# Patient Record
Sex: MALE | Race: WHITE
[De-identification: names, ages, dates, MRNs, and addresses within clinical notes are randomized per-mention and may not be internally consistent; named-entity substitution may affect disease eponyms.]

---

## 2024-04-28 ENCOUNTER — APPOINTMENT (OUTPATIENT)
Facility: HOSPITAL | Age: 44
End: 2024-04-28

## 2024-04-28 ENCOUNTER — HOSPITAL ENCOUNTER (EMERGENCY)
Facility: HOSPITAL | Age: 44
Discharge: ANOTHER HOSPITAL | End: 2024-04-28
Attending: STUDENT IN AN ORGANIZED HEALTH CARE EDUCATION/TRAINING PROGRAM

## 2024-04-28 VITALS
TEMPERATURE: 98 F | SYSTOLIC BLOOD PRESSURE: 117 MMHG | HEIGHT: 69 IN | OXYGEN SATURATION: 93 % | BODY MASS INDEX: 22.86 KG/M2 | HEART RATE: 62 BPM | DIASTOLIC BLOOD PRESSURE: 83 MMHG | RESPIRATION RATE: 22 BRPM | WEIGHT: 154.32 LBS

## 2024-04-28 DIAGNOSIS — I60.9 SAH (SUBARACHNOID HEMORRHAGE) (HCC): Primary | ICD-10-CM

## 2024-04-28 PROBLEM — R93.0 ABNORMAL CT OF THE HEAD: Status: ACTIVE | Noted: 2024-04-28

## 2024-04-28 LAB
ALBUMIN SERPL-MCNC: 4 G/DL (ref 3.5–5)
ALBUMIN/GLOB SERPL: 1.2 (ref 1.1–2.2)
ALP SERPL-CCNC: 91 U/L (ref 45–117)
ALT SERPL-CCNC: 25 U/L (ref 12–78)
ANION GAP SERPL CALC-SCNC: 6 MMOL/L (ref 5–15)
APPEARANCE UR: CLEAR
AST SERPL-CCNC: 37 U/L (ref 15–37)
BACTERIA URNS QL MICRO: NEGATIVE /HPF
BASOPHILS # BLD: 0.1 K/UL (ref 0–0.1)
BASOPHILS NFR BLD: 1 % (ref 0–1)
BILIRUB SERPL-MCNC: 0.9 MG/DL (ref 0.2–1)
BILIRUB UR QL: NEGATIVE
BUN SERPL-MCNC: 22 MG/DL (ref 6–20)
BUN/CREAT SERPL: 25 (ref 12–20)
CALCIUM SERPL-MCNC: 9.8 MG/DL (ref 8.5–10.1)
CHLORIDE SERPL-SCNC: 102 MMOL/L (ref 97–108)
CO2 SERPL-SCNC: 26 MMOL/L (ref 21–32)
COLOR UR: ABNORMAL
CREAT SERPL-MCNC: 0.87 MG/DL (ref 0.7–1.3)
DIFFERENTIAL METHOD BLD: ABNORMAL
EKG ATRIAL RATE: 54 BPM
EKG DIAGNOSIS: NORMAL
EKG P AXIS: 78 DEGREES
EKG P-R INTERVAL: 138 MS
EKG Q-T INTERVAL: 474 MS
EKG QRS DURATION: 90 MS
EKG QTC CALCULATION (BAZETT): 449 MS
EKG R AXIS: 74 DEGREES
EKG T AXIS: 76 DEGREES
EKG VENTRICULAR RATE: 54 BPM
EOSINOPHIL # BLD: 0.1 K/UL (ref 0–0.4)
EOSINOPHIL NFR BLD: 1 % (ref 0–7)
EPITH CASTS URNS QL MICRO: ABNORMAL /LPF
ERYTHROCYTE [DISTWIDTH] IN BLOOD BY AUTOMATED COUNT: 12.8 % (ref 11.5–14.5)
FLUAV AG NPH QL IA: NEGATIVE
FLUBV AG NOSE QL IA: NEGATIVE
GLOBULIN SER CALC-MCNC: 3.4 G/DL (ref 2–4)
GLUCOSE SERPL-MCNC: 124 MG/DL (ref 65–100)
GLUCOSE UR STRIP.AUTO-MCNC: NEGATIVE MG/DL
HCT VFR BLD AUTO: 47.8 % (ref 36.6–50.3)
HGB BLD-MCNC: 16 G/DL (ref 12.1–17)
HGB UR QL STRIP: NEGATIVE
HYALINE CASTS URNS QL MICRO: ABNORMAL /LPF (ref 0–2)
IMM GRANULOCYTES # BLD AUTO: 0 K/UL (ref 0–0.04)
IMM GRANULOCYTES NFR BLD AUTO: 0 % (ref 0–0.5)
KETONES UR QL STRIP.AUTO: ABNORMAL MG/DL
LEUKOCYTE ESTERASE UR QL STRIP.AUTO: NEGATIVE
LIPASE SERPL-CCNC: 28 U/L (ref 13–75)
LYMPHOCYTES # BLD: 2.1 K/UL (ref 0.8–3.5)
LYMPHOCYTES NFR BLD: 17 % (ref 12–49)
MAGNESIUM SERPL-MCNC: 2.3 MG/DL (ref 1.6–2.4)
MCH RBC QN AUTO: 27.8 PG (ref 26–34)
MCHC RBC AUTO-ENTMCNC: 33.5 G/DL (ref 30–36.5)
MCV RBC AUTO: 83 FL (ref 80–99)
MONOCYTES # BLD: 0.9 K/UL (ref 0–1)
MONOCYTES NFR BLD: 8 % (ref 5–13)
NEUTS SEG # BLD: 8.9 K/UL (ref 1.8–8)
NEUTS SEG NFR BLD: 73 % (ref 32–75)
NITRITE UR QL STRIP.AUTO: NEGATIVE
NRBC # BLD: 0 K/UL (ref 0–0.01)
NRBC BLD-RTO: 0 PER 100 WBC
PH UR STRIP: 6 (ref 5–8)
PLATELET # BLD AUTO: 446 K/UL (ref 150–400)
PMV BLD AUTO: 9.2 FL (ref 8.9–12.9)
POTASSIUM SERPL-SCNC: 4.1 MMOL/L (ref 3.5–5.1)
PROT SERPL-MCNC: 7.4 G/DL (ref 6.4–8.2)
PROT UR STRIP-MCNC: NEGATIVE MG/DL
RBC # BLD AUTO: 5.76 M/UL (ref 4.1–5.7)
RBC #/AREA URNS HPF: ABNORMAL /HPF (ref 0–5)
SARS-COV-2 RDRP RESP QL NAA+PROBE: NOT DETECTED
SODIUM SERPL-SCNC: 134 MMOL/L (ref 136–145)
SOURCE: NORMAL
SP GR UR REFRACTOMETRY: 1.01 (ref 1–1.03)
URINE CULTURE IF INDICATED: ABNORMAL
UROBILINOGEN UR QL STRIP.AUTO: 1 EU/DL (ref 0.2–1)
WBC # BLD AUTO: 12 K/UL (ref 4.1–11.1)
WBC URNS QL MICRO: ABNORMAL /HPF (ref 0–4)

## 2024-04-28 PROCEDURE — 87635 SARS-COV-2 COVID-19 AMP PRB: CPT

## 2024-04-28 PROCEDURE — 6360000002 HC RX W HCPCS: Performed by: STUDENT IN AN ORGANIZED HEALTH CARE EDUCATION/TRAINING PROGRAM

## 2024-04-28 PROCEDURE — 70496 CT ANGIOGRAPHY HEAD: CPT

## 2024-04-28 PROCEDURE — 83735 ASSAY OF MAGNESIUM: CPT

## 2024-04-28 PROCEDURE — 85025 COMPLETE CBC W/AUTO DIFF WBC: CPT

## 2024-04-28 PROCEDURE — 80053 COMPREHEN METABOLIC PANEL: CPT

## 2024-04-28 PROCEDURE — 93005 ELECTROCARDIOGRAM TRACING: CPT | Performed by: STUDENT IN AN ORGANIZED HEALTH CARE EDUCATION/TRAINING PROGRAM

## 2024-04-28 PROCEDURE — 96374 THER/PROPH/DIAG INJ IV PUSH: CPT

## 2024-04-28 PROCEDURE — 2580000003 HC RX 258: Performed by: STUDENT IN AN ORGANIZED HEALTH CARE EDUCATION/TRAINING PROGRAM

## 2024-04-28 PROCEDURE — 81001 URINALYSIS AUTO W/SCOPE: CPT

## 2024-04-28 PROCEDURE — 83690 ASSAY OF LIPASE: CPT

## 2024-04-28 PROCEDURE — 6360000004 HC RX CONTRAST MEDICATION: Performed by: STUDENT IN AN ORGANIZED HEALTH CARE EDUCATION/TRAINING PROGRAM

## 2024-04-28 PROCEDURE — 96375 TX/PRO/DX INJ NEW DRUG ADDON: CPT

## 2024-04-28 PROCEDURE — 99285 EMERGENCY DEPT VISIT HI MDM: CPT

## 2024-04-28 PROCEDURE — 70450 CT HEAD/BRAIN W/O DYE: CPT

## 2024-04-28 PROCEDURE — 36415 COLL VENOUS BLD VENIPUNCTURE: CPT

## 2024-04-28 PROCEDURE — 87804 INFLUENZA ASSAY W/OPTIC: CPT

## 2024-04-28 RX ORDER — ONDANSETRON 2 MG/ML
4 INJECTION INTRAMUSCULAR; INTRAVENOUS ONCE
Status: COMPLETED | OUTPATIENT
Start: 2024-04-28 | End: 2024-04-28

## 2024-04-28 RX ORDER — METOCLOPRAMIDE HYDROCHLORIDE 5 MG/ML
10 INJECTION INTRAMUSCULAR; INTRAVENOUS ONCE
Status: COMPLETED | OUTPATIENT
Start: 2024-04-28 | End: 2024-04-28

## 2024-04-28 RX ORDER — 0.9 % SODIUM CHLORIDE 0.9 %
1000 INTRAVENOUS SOLUTION INTRAVENOUS ONCE
Status: COMPLETED | OUTPATIENT
Start: 2024-04-28 | End: 2024-04-28

## 2024-04-28 RX ADMIN — SODIUM CHLORIDE 1000 ML: 9 INJECTION, SOLUTION INTRAVENOUS at 10:23

## 2024-04-28 RX ADMIN — METOCLOPRAMIDE 10 MG: 5 INJECTION, SOLUTION INTRAMUSCULAR; INTRAVENOUS at 10:22

## 2024-04-28 RX ADMIN — ONDANSETRON 4 MG: 2 INJECTION INTRAMUSCULAR; INTRAVENOUS at 10:22

## 2024-04-28 RX ADMIN — IOPAMIDOL 100 ML: 755 INJECTION, SOLUTION INTRAVENOUS at 11:06

## 2024-04-28 ASSESSMENT — PAIN DESCRIPTION - PAIN TYPE: TYPE: ACUTE PAIN

## 2024-04-28 ASSESSMENT — PAIN SCALES - GENERAL
PAINLEVEL_OUTOF10: 1
PAINLEVEL_OUTOF10: 2

## 2024-04-28 ASSESSMENT — PAIN - FUNCTIONAL ASSESSMENT: PAIN_FUNCTIONAL_ASSESSMENT: ACTIVITIES ARE NOT PREVENTED

## 2024-04-28 ASSESSMENT — PAIN DESCRIPTION - LOCATION
LOCATION: HEAD

## 2024-04-28 ASSESSMENT — PAIN DESCRIPTION - ONSET: ONSET: ON-GOING

## 2024-04-28 ASSESSMENT — PAIN DESCRIPTION - FREQUENCY: FREQUENCY: CONTINUOUS

## 2024-04-28 ASSESSMENT — PAIN DESCRIPTION - DESCRIPTORS: DESCRIPTORS: ACHING

## 2024-04-28 NOTE — ED PROVIDER NOTES
EMERGENCY DEPARTMENT HISTORY AND PHYSICAL EXAM      Date: 4/28/2024  Patient Name: Eric Conti    History of Presenting Illness     Chief Complaint   Patient presents with    Headache    Abdominal Pain     Headache for 2 days   Abdominal pain since last night when ate some chicken.  Complaints of vomiting x 2.             HPI: History From: patient, History limited by: none, Persian  #[id number] is used  Eric Conti, 44 y.o. male presents to the ED with cc of nausea, vomiting, headache.  He states that he ate some chicken, then began to feel nauseous and like his temperature was rising.  For the past 3 days he has had continuous nausea and vomiting, inability to tolerate p.o., and increasing generalized weakness with subjective fevers and sweats.  He reports an associated holoacranial headache. Denies history of headache, report that it is severe.  He denies any recent travel or sick exposures.  No weakness or numbness focally, no dysuria or hematuria, no abdominal pain.  He reports a chronic cough in the setting of smoking tobacco, otherwise denies acute change.  He reports a history of a prior stroke and was previously on a \"blood thinner\" which was stopped when [place].  He takes a cholesterol medication, otherwise denies blood thinner or aspirin.  He denies family history of major medical problems.  Other than tobacco, he denies alcohol or illicit drug use.          There are no other complaints, changes, or physical findings at this time.    PCP: No primary care provider on file.    No current facility-administered medications on file prior to encounter.     No current outpatient medications on file prior to encounter.       Past History     Past Medical History:  No past medical history on file.    Past Surgical History:  No past surgical history on file.    Family History:  No family history on file.    Social History:       Allergies:  No Known Allergies      Physical

## 2024-04-28 NOTE — ED TRIAGE NOTES
Patient arrival [facility].  Called by family.  Patient has had a \"headache for 2 days.  Now having abdominal pain after eating chicken last night.\"  Vomited x 2 prior to arrival.    \"Just let me rest for a little while, I have a headache.\"  Patient instructed on need for triage questions to let provider know why he is here.\"      Patient placed on cardiac monitor and pulse oximetry.    Lying on his left side in fetal position on time of triage.    Denies past medical history.  States he used to take cholesterol medication and blood thinner but his doctor stopped them and denies medications at this time.\"    0918  Dr. Vyas to room for assessment at this time.    0948  IV placed by ED tech.  Labs obtained.  Covid and flu swabs obtained by this RN.      0957  Patient off the floor for CT at this time.  Will medicate upon patient return.    1028  Medicated at this time.   Mariah #[id number] utilized to explain medications and IV fluids.    1114   Rustam #[id number]. Patient YOB: 1980. Registration called to verify name and date of birth.  NIHSS completed.  Dr. Vyas at bedside to discuss results and plan of care.    1130  Patient states date of birth 1980.  Registration notified to register patient.  NPO.  Swallow screen deferred at this time for NPO.    1156  Per registration, unable to complete registration.  Patient does not have an ID that confirms his date of birth.    1410  Transfer report given to [address] RN at this time.  Pending H2H for hospital to hospital transfer at this time.      Report called to [address].    1543    To [place] with H2H at this time.

## 2024-04-29 PROBLEM — R51.9 ACUTE INTRACTABLE HEADACHE: Status: ACTIVE | Noted: 2024-04-29

## 2024-04-30 PROBLEM — I60.9 SAH (SUBARACHNOID HEMORRHAGE) (HCC): Status: ACTIVE | Noted: 2024-04-30

## 2024-04-30 PROBLEM — I60.9 SUBARACHNOID BLEED (HCC): Status: ACTIVE | Noted: 2024-04-30

## 2024-05-01 ENCOUNTER — ANESTHESIA EVENT (OUTPATIENT)
Facility: HOSPITAL | Age: 44
End: 2024-05-01

## 2024-05-01 ENCOUNTER — ANESTHESIA (OUTPATIENT)
Facility: HOSPITAL | Age: 44
End: 2024-05-01

## 2024-05-01 PROCEDURE — 6360000002 HC RX W HCPCS: Performed by: NURSE PRACTITIONER

## 2024-05-01 PROCEDURE — 2500000003 HC RX 250 WO HCPCS: Performed by: NURSE PRACTITIONER

## 2024-05-01 PROCEDURE — 2580000003 HC RX 258: Performed by: NURSE PRACTITIONER

## 2024-05-01 RX ORDER — DEXAMETHASONE SODIUM PHOSPHATE 4 MG/ML
INJECTION, SOLUTION INTRA-ARTICULAR; INTRALESIONAL; INTRAMUSCULAR; INTRAVENOUS; SOFT TISSUE PRN
Status: DISCONTINUED | OUTPATIENT
Start: 2024-05-01 | End: 2024-05-01 | Stop reason: SDUPTHER

## 2024-05-01 RX ORDER — SODIUM CHLORIDE, SODIUM LACTATE, POTASSIUM CHLORIDE, CALCIUM CHLORIDE 600; 310; 30; 20 MG/100ML; MG/100ML; MG/100ML; MG/100ML
INJECTION, SOLUTION INTRAVENOUS CONTINUOUS PRN
Status: DISCONTINUED | OUTPATIENT
Start: 2024-05-01 | End: 2024-05-01 | Stop reason: SDUPTHER

## 2024-05-01 RX ORDER — ROCURONIUM BROMIDE 10 MG/ML
INJECTION, SOLUTION INTRAVENOUS PRN
Status: DISCONTINUED | OUTPATIENT
Start: 2024-05-01 | End: 2024-05-01 | Stop reason: SDUPTHER

## 2024-05-01 RX ORDER — FENTANYL CITRATE 50 UG/ML
INJECTION, SOLUTION INTRAMUSCULAR; INTRAVENOUS PRN
Status: DISCONTINUED | OUTPATIENT
Start: 2024-05-01 | End: 2024-05-01 | Stop reason: SDUPTHER

## 2024-05-01 RX ORDER — ONDANSETRON 2 MG/ML
INJECTION INTRAMUSCULAR; INTRAVENOUS PRN
Status: DISCONTINUED | OUTPATIENT
Start: 2024-05-01 | End: 2024-05-01 | Stop reason: SDUPTHER

## 2024-05-01 RX ORDER — SUCCINYLCHOLINE/SOD CL,ISO/PF 200MG/10ML
SYRINGE (ML) INTRAVENOUS PRN
Status: DISCONTINUED | OUTPATIENT
Start: 2024-05-01 | End: 2024-05-01 | Stop reason: SDUPTHER

## 2024-05-01 RX ORDER — NEOSTIGMINE METHYLSULFATE 1 MG/ML
INJECTION, SOLUTION INTRAVENOUS PRN
Status: DISCONTINUED | OUTPATIENT
Start: 2024-05-01 | End: 2024-05-01 | Stop reason: SDUPTHER

## 2024-05-01 RX ORDER — GLYCOPYRROLATE 0.2 MG/ML
INJECTION INTRAMUSCULAR; INTRAVENOUS PRN
Status: DISCONTINUED | OUTPATIENT
Start: 2024-05-01 | End: 2024-05-01 | Stop reason: SDUPTHER

## 2024-05-01 RX ORDER — LIDOCAINE HYDROCHLORIDE 20 MG/ML
INJECTION, SOLUTION EPIDURAL; INFILTRATION; INTRACAUDAL; PERINEURAL PRN
Status: DISCONTINUED | OUTPATIENT
Start: 2024-05-01 | End: 2024-05-01 | Stop reason: SDUPTHER

## 2024-05-01 RX ADMIN — FENTANYL CITRATE 50 MCG: 50 INJECTION, SOLUTION INTRAMUSCULAR; INTRAVENOUS at 08:12

## 2024-05-01 RX ADMIN — PROPOFOL 150 MG: 10 INJECTION, EMULSION INTRAVENOUS at 08:12

## 2024-05-01 RX ADMIN — GLYCOPYRROLATE 0.6 MG: 0.2 INJECTION INTRAMUSCULAR; INTRAVENOUS at 08:53

## 2024-05-01 RX ADMIN — ROCURONIUM BROMIDE 20 MG: 10 SOLUTION INTRAVENOUS at 08:16

## 2024-05-01 RX ADMIN — NEOSTIGMINE METHYLSULFATE 3 MG: 1 INJECTION, SOLUTION INTRAVENOUS at 08:53

## 2024-05-01 RX ADMIN — ROCURONIUM BROMIDE 10 MG: 10 SOLUTION INTRAVENOUS at 08:12

## 2024-05-01 RX ADMIN — ONDANSETRON 4 MG: 2 INJECTION INTRAMUSCULAR; INTRAVENOUS at 08:17

## 2024-05-01 RX ADMIN — LIDOCAINE HYDROCHLORIDE 80 MG: 20 INJECTION, SOLUTION EPIDURAL; INFILTRATION; INTRACAUDAL; PERINEURAL at 08:12

## 2024-05-01 RX ADMIN — FENTANYL CITRATE 50 MCG: 50 INJECTION, SOLUTION INTRAMUSCULAR; INTRAVENOUS at 08:19

## 2024-05-01 RX ADMIN — SODIUM CHLORIDE, SODIUM LACTATE, POTASSIUM CHLORIDE, AND CALCIUM CHLORIDE: 600; 310; 30; 20 INJECTION, SOLUTION INTRAVENOUS at 08:05

## 2024-05-01 RX ADMIN — DEXAMETHASONE SODIUM PHOSPHATE 4 MG: 4 INJECTION INTRA-ARTICULAR; INTRALESIONAL; INTRAMUSCULAR; INTRAVENOUS; SOFT TISSUE at 08:17

## 2024-05-01 RX ADMIN — Medication 200 MG: at 08:12

## 2024-05-01 ASSESSMENT — LIFESTYLE VARIABLES: SMOKING_STATUS: 1

## 2024-05-01 NOTE — ANESTHESIA PRE PROCEDURE
Department of Anesthesiology  Preprocedure Note       Name:  Yas Multani   Age:  43 y.o.  :  1980                                          MRN:  [id number]         Date:  2024      Surgeon: Surgeon(s):  Benja Rodriguez DO    Procedure: * No procedures listed *    Medications prior to admission:   Prior to Admission medications    Medication Sig Start Date End Date Taking? Authorizing Provider   atorvastatin (LIPITOR) 40 MG tablet Take 1 tablet by mouth nightly 24   Jasmina Dotson MD   butalbital-acetaminophen-caffeine (FIORICET, ESGIC) -40 MG per tablet Take 1 tablet by mouth every 4 hours as needed for Headaches (For moderate pain) 24   Jasmina Dotson MD       Current medications:    No current facility-administered medications for this encounter.     Current Outpatient Medications   Medication Sig Dispense Refill    atorvastatin (LIPITOR) 40 MG tablet Take 1 tablet by mouth nightly 30 tablet 3    butalbital-acetaminophen-caffeine (FIORICET, ESGIC) -40 MG per tablet Take 1 tablet by mouth every 4 hours as needed for Headaches (For moderate pain) 180 tablet 3     Facility-Administered Medications Ordered in Other Encounters   Medication Dose Route Frequency Provider Last Rate Last Admin    atorvastatin (LIPITOR) tablet 40 mg  40 mg Oral Nightly Jasmina Dotson MD   40 mg at 24    sodium chloride flush 0.9 % injection 10 mL  10 mL IntraVENous Once Marla Sanchez, APRN - NP        niMODipine (NIMOTOP) capsule 60 mg  60 mg Oral 6 times per day Benja Rodriguez DO   60 mg at 24 0724    sodium chloride flush 0.9 % injection 5-40 mL  5-40 mL IntraVENous 2 times per day Karis Mahmood MD   10 mL at 24    sodium chloride flush 0.9 % injection 5-40 mL  5-40 mL IntraVENous PRN Karis Mahmood MD        potassium chloride (KLOR-CON) extended release tablet 40 mEq  40 mEq Oral PRN Karis Mahmood MD        Or    potassium bicarb-citric acid

## 2024-05-01 NOTE — ANESTHESIA POSTPROCEDURE EVALUATION
Post-Anesthesia Evaluation and Assessment    Patient: Yas Multani MRN: [id number]  SSN: [id number]    YOB: 1980  Age: 43 y.o.  Sex: male      I have evaluated the patient and they are stable and ready for discharge from the PACU.     Cardiovascular Function/Vital Signs  Visit Vitals  /82   Pulse 56   Temp 97.6 °F (36.4 °C) (Axillary)   Resp 13   SpO2 96%       Patient is status post * No anesthesia type entered * anesthesia for * No procedures listed *.    Nausea/Vomiting: None    Postoperative hydration reviewed and adequate.    Pain:      Managed    Neurological Status:       At baseline    Mental Status, Level of Consciousness: Alert and  oriented to person, place, and time    Pulmonary Status:       Adequate oxygenation and airway patent    Complications related to anesthesia: None    Post-anesthesia assessment completed. No concerns    Signed By: Jesus Chong MD     May 1, 2024            Department of Anesthesiology  Postprocedure Note    Patient: Yas Multani  MRN: [id number]  YOB: 1980  Date of evaluation: 5/1/2024    Procedure Summary       Date: 05/01/24 Room / Location: [facility] ANGIO IR    Anesthesia Start: 0805 Anesthesia Stop: 0905    Procedure: IR ARCH UNI CAR CERV CEREBRAL Diagnosis: (aneurysm embolization)    Scheduled Providers: Jesus Chong MD Responsible Provider: Jesus Chong MD    Anesthesia Type: General ASA Status: 3            Anesthesia Type: General    [place] Phase I:      [place] Phase II:      Anesthesia Post Evaluation    No notable events documented.

## 2024-05-04 PROBLEM — I60.8 SUBARACHNOID HEMORRHAGE DUE TO RUPTURED ANEURYSM (HCC): Status: ACTIVE | Noted: 2024-05-04
